# Patient Record
Sex: FEMALE | Race: WHITE | NOT HISPANIC OR LATINO | Employment: FULL TIME | URBAN - METROPOLITAN AREA
[De-identification: names, ages, dates, MRNs, and addresses within clinical notes are randomized per-mention and may not be internally consistent; named-entity substitution may affect disease eponyms.]

---

## 2017-03-13 ENCOUNTER — OFFICE VISIT (OUTPATIENT)
Dept: ORTHOPEDICS | Facility: CLINIC | Age: 57
End: 2017-03-13
Payer: COMMERCIAL

## 2017-03-13 VITALS
WEIGHT: 181 LBS | SYSTOLIC BLOOD PRESSURE: 118 MMHG | DIASTOLIC BLOOD PRESSURE: 77 MMHG | BODY MASS INDEX: 28.41 KG/M2 | HEIGHT: 67 IN | HEART RATE: 71 BPM

## 2017-03-13 DIAGNOSIS — M25.552 LEFT HIP PAIN: Primary | ICD-10-CM

## 2017-03-13 PROCEDURE — 99203 OFFICE O/P NEW LOW 30 MIN: CPT | Mod: S$GLB,,, | Performed by: ORTHOPAEDIC SURGERY

## 2017-03-13 PROCEDURE — 1160F RVW MEDS BY RX/DR IN RCRD: CPT | Mod: S$GLB,,, | Performed by: ORTHOPAEDIC SURGERY

## 2017-03-13 RX ORDER — MELOXICAM 15 MG/1
15 TABLET ORAL DAILY
COMMUNITY
End: 2017-03-17 | Stop reason: SDUPTHER

## 2017-03-13 NOTE — LETTER
March 13, 2017      Mode Lara MD  [address]  [facility] [place] [address]           [facility]  [address] [facility] [place] [address]  Phone: [phone]  Fax: [phone]          Patient: Danya Johnson   MR Number: [id number]   YOB: 1960   Date of Visit: 3/13/2017       Dear Dr. Mode Lara:    Thank you for referring Danya Johnson to me for evaluation. Attached you will find relevant portions of my assessment and plan of care.    If you have questions, please do not hesitate to call me. I look forward to following Danya Johnson along with you.    Sincerely,    Tony Lynn MD    Enclosure  CC:  No Recipients    If you would like to receive this communication electronically, please contact [email or web address] or [phone] to request more information on [employer] Link access.    For providers and/or their staff who would like to refer a patient to [facility], please contact us through our one-stop-shop provider referral line, [facility], at [phone].    If you feel you have received this communication in error or would no longer like to receive these types of communications, please e-mail [email or web address]

## 2017-03-13 NOTE — PROGRESS NOTES
Past Medical History:   Diagnosis Date    Arthritis        Past Surgical History:   Procedure Laterality Date     SECTION  1985    HYSTERECTOMY  1998       Current Outpatient Prescriptions   Medication Sig    meloxicam (MOBIC) 15 MG tablet Take 15 mg by mouth once daily.     No current facility-administered medications for this visit.        Review of patient's allergies indicates:  No Known Allergies    History reviewed. No pertinent family history.    Social History     Social History    Marital status:      Spouse name: N/A    Number of children: N/A    Years of education: N/A     Occupational History    Not on file.     Social History Main Topics    Smoking status: Current Every Day Smoker    Smokeless tobacco: Never Used    Alcohol use Not on file    Drug use: Not on file    Sexual activity: Not on file     Other Topics Concern    Not on file     Social History Narrative    No narrative on file       Chief Complaint:   Chief Complaint   Patient presents with    Left Hip - Pain       Consulting Physician: Mode Lara MD    History of present illness:    This is a 57 y.o. year old female who complains of left hip pain that she has had since she was young but has recently gotten worse in the last few months.  She states that she is always had popping and some degree of pain in her hip but over the last few months the pain is gotten up to a 9 out of 10.  She localizes the pain to the anterior groin area.  She states that anti-inflammatories to help somewhat.  She has done exercises which does not help.  She is also had prednisone which was helpful while she was taking it.  She denies any injury that she can recall.    Review of Systems:    Constitution: Denies chills, fever, and sweats.  HENT: Denies headaches or blurry vision.  Cardiovascular: Denies chest pain or irregular heart beat.  Respiratory: Denies cough or shortness of breath.  Gastrointestinal: Denies abdominal pain,  "nausea, or vomiting.  Musculoskeletal:  Denies muscle cramps.  Neurological: Denies dizziness or focal weakness.  Psychiatric/Behavioral: Normal mental status.  Hematologic/Lymphatic: Denies bleeding problem or easy bruising/bleeding.  Skin: Denies rash or suspicious lesions.    Examination:    Vital Signs:    Vitals:    03/13/17 1346   BP: 118/77   Pulse: 71   Weight: 82.1 kg (181 lb)   Height: 5' 7" (1.702 m)   PainSc:   9   PainLoc: Hip       Body mass index is 28.35 kg/(m^2).    This a well-developed, well nourished patient in no acute distress.    Alert and oriented and cooperative to examination.       Physical Exam: Left Hip Exam    Gait:   Normal    Skin  Rash:   None  Scars:   None    Inspection  Erythema:  None  Bruising:  None  Swelling:  None  Masses:  None  Lymphadenopathy: None    Range of Motion  Flexion:  150°  Extension:  0°  External Rotation: 50°  Internal Rotation: 15° with pain  Abduction:  50°    pain with hip range of motion.    Straight Leg Raise: Negative  Log Roll:  positive    Tenderness  Groin:   +  Greater Trochanter: +    Strength:  4+/5    Stability:  Normal    Sensation:  Intact    Vascular  Pulses:  Palpable distally          Imaging: X-rays ordered and reviewed today of the left hip reveal some mild degenerative changes.        Assessment: Left hip pain        Plan:  She has a painful pop in her left hip.  This is gotten significantly worse the last few months.  Although anti-inflammatories do help it does not last.  At this point I like to obtain an MRI for evaluation of a possible labral tear or other intra-articular pathology.  We did discuss an intra-articular injection she declined that.      DISCLAIMER: This note may have been dictated using voice recognition software and may contain grammatical errors.     NOTE: Consult report sent to referring provider via EPIC EMR.  "

## 2017-03-17 ENCOUNTER — HOSPITAL ENCOUNTER (OUTPATIENT)
Dept: RADIOLOGY | Facility: HOSPITAL | Age: 57
Discharge: HOME | End: 2017-03-17
Attending: ORTHOPAEDIC SURGERY
Payer: COMMERCIAL

## 2017-03-17 DIAGNOSIS — M25.552 LEFT HIP PAIN: ICD-10-CM

## 2017-03-17 PROCEDURE — 73721 MRI JNT OF LWR EXTRE W/O DYE: CPT | Mod: 26,LT,, | Performed by: RADIOLOGY

## 2017-03-17 PROCEDURE — 73721 MRI JNT OF LWR EXTRE W/O DYE: CPT | Mod: TC,LT

## 2017-03-17 RX ORDER — MELOXICAM 15 MG/1
15 TABLET ORAL DAILY
Qty: 30 TABLET | Refills: 3 | Status: SHIPPED | OUTPATIENT
Start: 2017-03-17 | End: 2020-12-02

## 2017-03-17 NOTE — TELEPHONE ENCOUNTER
----- Message from RT Jose sent at 3/17/2017  3:07 PM CDT -----  Contact: [phone]   Pt , requesting medication refill: Mobic, thanks.

## 2017-03-27 ENCOUNTER — OFFICE VISIT (OUTPATIENT)
Dept: ORTHOPEDICS | Facility: CLINIC | Age: 57
End: 2017-03-27
Payer: COMMERCIAL

## 2017-03-27 VITALS
HEIGHT: 67 IN | SYSTOLIC BLOOD PRESSURE: 114 MMHG | WEIGHT: 181 LBS | HEART RATE: 78 BPM | BODY MASS INDEX: 28.41 KG/M2 | DIASTOLIC BLOOD PRESSURE: 75 MMHG

## 2017-03-27 DIAGNOSIS — M25.552 LEFT HIP PAIN: Primary | ICD-10-CM

## 2017-03-27 PROCEDURE — 99213 OFFICE O/P EST LOW 20 MIN: CPT | Mod: S$GLB,,, | Performed by: ORTHOPAEDIC SURGERY

## 2017-03-27 PROCEDURE — 1160F RVW MEDS BY RX/DR IN RCRD: CPT | Mod: S$GLB,,, | Performed by: ORTHOPAEDIC SURGERY

## 2017-03-27 RX ORDER — TRAMADOL HYDROCHLORIDE 50 MG/1
50 TABLET ORAL EVERY 8 HOURS PRN
COMMUNITY
End: 2020-12-06 | Stop reason: CLARIF

## 2017-03-28 NOTE — PROGRESS NOTES
Past Medical History:   Diagnosis Date    Arthritis        Past Surgical History:   Procedure Laterality Date     SECTION  1985    HYSTERECTOMY         Current Outpatient Prescriptions   Medication Sig    meloxicam (MOBIC) 15 MG tablet Take 1 tablet (15 mg total) by mouth once daily.    tramadol (ULTRAM) 50 mg tablet Take 50 mg by mouth every 8 (eight) hours as needed for Pain.     No current facility-administered medications for this visit.        Review of patient's allergies indicates:  No Known Allergies    History reviewed. No pertinent family history.    Social History     Social History    Marital status:      Spouse name: N/A    Number of children: N/A    Years of education: N/A     Occupational History    Not on file.     Social History Main Topics    Smoking status: Current Every Day Smoker    Smokeless tobacco: Never Used    Alcohol use Not on file    Drug use: Not on file    Sexual activity: Not on file     Other Topics Concern    Not on file     Social History Narrative       Chief Complaint:   Chief Complaint   Patient presents with    Left Hip - Pain       Consulting Physician: No ref. provider found    History of present illness:    This is a 57 y.o. year old female who complains of left hip pain that she has had since she was young but has recently gotten worse in the last few months.  She states that she is always had popping and some degree of pain in her hip but over the last few months the pain is gotten up to a 9 out of 10.  She localizes the pain to the anterior groin area.  She states that anti-inflammatories to help somewhat.  She has done exercises which does not help.  She is also had prednisone which was helpful while she was taking it.  She denies any injury that she can recall.    Review of Systems:    Constitution: Denies chills, fever, and sweats.  HENT: Denies headaches or blurry vision.  Cardiovascular: Denies chest pain or irregular heart  "beat.  Respiratory: Denies cough or shortness of breath.  Gastrointestinal: Denies abdominal pain, nausea, or vomiting.  Musculoskeletal:  Denies muscle cramps.  Neurological: Denies dizziness or focal weakness.  Psychiatric/Behavioral: Normal mental status.  Hematologic/Lymphatic: Denies bleeding problem or easy bruising/bleeding.  Skin: Denies rash or suspicious lesions.    Examination:    Vital Signs:    Vitals:    03/27/17 1130   BP: 114/75   Pulse: 78   Weight: 82.1 kg (181 lb)   Height: 5' 7" (1.702 m)   PainSc:   8   PainLoc: Hip       Body mass index is 28.35 kg/(m^2).    This a well-developed, well nourished patient in no acute distress.    Alert and oriented and cooperative to examination.       Physical Exam: Left Hip Exam    Gait:   Normal    Skin  Rash:   None  Scars:   None    Inspection  Erythema:  None  Bruising:  None  Swelling:  None  Masses:  None  Lymphadenopathy: None    Range of Motion  Flexion:  150°  Extension:  0°  External Rotation: 50°  Internal Rotation: 15° with pain  Abduction:  50°    pain with hip range of motion.    Straight Leg Raise: Negative  Log Roll:  positive    Tenderness  Groin:   +  Greater Trochanter: +    Strength:  4+/5    Stability:  Normal    Sensation:  Intact    Vascular  Pulses:  Palpable distally          Imaging: X-rays ordered and reviewed today of the left hip reveal some mild degenerative changes.  The MRI of her hip shows no obvious pathology or large labral tear.  Her fovea does look slightly enlarged and there is a small signal on the anterior superior labrum.       Assessment: Left hip pain        Plan:  She continues to have pain and popping in her hip.  At this point I suggested that we get an intra-articular hip injection to localize our pathology better.    DISCLAIMER: This note may have been dictated using voice recognition software and may contain grammatical errors.     NOTE: Consult report sent to referring provider via EPIC EMR.  "

## 2017-04-03 ENCOUNTER — OFFICE VISIT (OUTPATIENT)
Dept: PHYSICAL MEDICINE AND REHAB | Facility: CLINIC | Age: 57
End: 2017-04-03
Payer: COMMERCIAL

## 2017-04-03 ENCOUNTER — TELEPHONE (OUTPATIENT)
Dept: PHYSICAL MEDICINE AND REHAB | Facility: CLINIC | Age: 57
End: 2017-04-03

## 2017-04-03 VITALS
DIASTOLIC BLOOD PRESSURE: 75 MMHG | WEIGHT: 181 LBS | BODY MASS INDEX: 28.41 KG/M2 | HEIGHT: 67 IN | SYSTOLIC BLOOD PRESSURE: 122 MMHG | HEART RATE: 81 BPM

## 2017-04-03 DIAGNOSIS — M25.552 LEFT HIP PAIN: Primary | ICD-10-CM

## 2017-04-03 DIAGNOSIS — M16.12 PRIMARY OSTEOARTHRITIS OF LEFT HIP: ICD-10-CM

## 2017-04-03 PROCEDURE — 99999 PR PBB SHADOW E&M-EST. PATIENT-LVL II: CPT | Mod: PBBFAC,,, | Performed by: PHYSICAL MEDICINE & REHABILITATION

## 2017-04-03 PROCEDURE — 99243 OFF/OP CNSLTJ NEW/EST LOW 30: CPT | Mod: 25,S$GLB,, | Performed by: PHYSICAL MEDICINE & REHABILITATION

## 2017-04-03 PROCEDURE — 20611 DRAIN/INJ JOINT/BURSA W/US: CPT | Mod: S$GLB,,, | Performed by: PHYSICAL MEDICINE & REHABILITATION

## 2017-04-03 RX ORDER — TRIAMCINOLONE ACETONIDE 40 MG/ML
40 INJECTION, SUSPENSION INTRA-ARTICULAR; INTRAMUSCULAR
Status: DISCONTINUED | OUTPATIENT
Start: 2017-04-03 | End: 2017-04-03 | Stop reason: HOSPADM

## 2017-04-03 RX ADMIN — TRIAMCINOLONE ACETONIDE 40 MG: 40 INJECTION, SUSPENSION INTRA-ARTICULAR; INTRAMUSCULAR at 08:04

## 2017-04-03 NOTE — PROGRESS NOTES
[facility]    Consulting Provider: Dr. Tony Lynn    CHIEF COMPLAINT: Left hip pain    HISTORY OF PRESENT ILLNESS: Danya Johnson is a 57 y.o. female who presents to me for the first time for chronic left hip pain.  There was no specific injury or trauma.  The pain is been increasing over the last several months.  There is a painful pop present.  She rates her pain as a 7 on a scale of 1-10.  She locates the pain deep in the left hip region.  The pain is achy and sharp in nature.  She has tried NSAIDs including meloxicam which does help but temporarily.  She presents today for an ultrasound-guided injection of corticosteroid to the left hip joint.    Review of Systems   Constitutional: Negative for fever.   HENT: Negative for drooling.    Eyes: Negative for discharge.   Respiratory: Negative for choking.    Cardiovascular: Negative for chest pain.   Genitourinary: Negative for flank pain.   Skin: Negative for wound.   Allergic/Immunologic: Negative for immunocompromised state.   Neurological: Negative for tremors and syncope.   Psychiatric/Behavioral: Negative for behavioral problems.     Past Medical History:   Past Medical History:   Diagnosis Date    Arthritis        Past Surgical History:   Past Surgical History:   Procedure Laterality Date     SECTION  1985    HYSTERECTOMY         Family History: No family history on file.    Medications:   Current Outpatient Prescriptions on File Prior to Visit   Medication Sig Dispense Refill    meloxicam (MOBIC) 15 MG tablet Take 1 tablet (15 mg total) by mouth once daily. 30 tablet 3    tramadol (ULTRAM) 50 mg tablet Take 50 mg by mouth every 8 (eight) hours as needed for Pain.       No current facility-administered medications on file prior to visit.        Allergies: Review of patient's allergies indicates:  No Known Allergies    Social History:   Social History     Social History    Marital status:      Spouse  name: N/A    Number of children: N/A    Years of education: N/A     Social History Main Topics    Smoking status: Current Every Day Smoker    Smokeless tobacco: Never Used    Alcohol use Not on file    Drug use: Not on file    Sexual activity: Not on file     Other Topics Concern    Not on file     Social History Narrative     PHYSICAL EXAMINATION:   General  Blood pressure 122/75, pulse 81  Constitutional: Oriented to person, place, and time. No apparent distress. Appears well-developed and well-nourished. Pleasant.  HENT:   Head: Normocephalic and atraumatic.   Eyes: Right eye exhibits no discharge. Left eye exhibits no discharge. No scleral icterus.   Pulmonary/Chest: Effort normal. No respiratory distress.   Abdominal: There is no guarding.   Neurological: Alert and oriented to person, place, and time.   Psychiatric: Behavior is normal.   Right Hip Exam     Tenderness   The patient is experiencing no tenderness.         Range of Motion   Flexion: normal   Internal Rotation: normal   External Rotation: normal   Abduction: normal   Adduction: normal     Muscle Strength   Abduction: 5/5   Adduction: 5/5   Flexion: 5/5     Other   Erythema: absent  Scars: absent  Sensation: normal  Pulse: present      Left Hip Exam     Tenderness   The patient is experiencing no tenderness.         Range of Motion   Flexion: 130   Internal Rotation: 20   External Rotation: 60   Abduction: 40   Adduction: 20     Muscle Strength   Abduction: 4/5   Adduction: 5/5   Flexion: 4/5     Tests   NOLAN: negative    Other   Erythema: absent  Scars: absent  Sensation: normal  Pulse: present        INSPECTION: There is no swelling, ecchymoses, erythema or gross deformity about the left hip.  LIGAMENTOUS LAXITY AND STABILITY: Negative NOLAN test. No pain with SI joint compression.   GAIT/DYNAMIC: Her gait is preserved.    Imaging  MRI of the left hip from 3/17/2017: 1.  Mild insertional tendinopathy at the greater trochanteric insertion  "of the left gluteus medius and minimus.  2.  Symmetric mild bilateral femoroacetabular osteoarthritis.  3.  No other acute abnormality to explain this patient's symptoms.    Data Reviewed: MRI    Supportive Actions: Independent visualization of images or test specimens    ASSESSMENT:   1. Left hip pain    2. Primary osteoarthritis of left hip      PLAN:     1. Time was spent reviewing the above diagnosis in depth with Danya shaniqua, including acute management and rehabilitation.     2.  She has signs, symptoms, and MRI findings suggestive of left intra-articular hip pathology.  I believe an ultrasound-guided injection of corticosteroid to the left hip joint is reasonable has both a therapeutic and diagnostic measure.  She is in favor of this option, see separate procedure note.    3. RTC prn.  She will follow-up with Dr. Lynn.    This is a consult from Dr. Tony Lynn. Please see the "Communications" section of Epic to see how the consulting physician received the report of today's findings and recommendations. If it's an [facility] physician, it will be forwarded to his/her "in basket".    The above note was completed, in part, with the aid of Dragon dictation software/hardware. Translation errors may be present.    "

## 2017-04-03 NOTE — PROCEDURES
Large Joint Aspiration/Injection  Date/Time: 4/3/2017 8:27 AM  Performed by: JAMIE ORTEGA  Authorized by: JAMIE ORTEGA     Consent Done?:  Yes (Verbal)  Indications:  Pain  Procedure site marked: Yes    Timeout: Prior to procedure the correct patient, procedure, and site was verified      Location:  Hip  Site:  L hip joint  Prep: Patient was prepped and draped in usual sterile fashion    Ultrasonic Guidance for needle placement: Yes  Images are saved and documented.  Needle size:  22 G  Approach: Needle in plane, distal to proximal, lateral to medial.  Medications:  40 mg triamcinolone acetonide 40 mg/mL  Patient tolerance:  Patient tolerated the procedure well with no immediate complications    Additional Comments: Ultrasound guidance was used for correct needle placement, the images were saved will be uploaded to EMR.

## 2017-04-03 NOTE — LETTER
April 3, 2017      Tony Lynn MD  [address] [place] [id number]           [facility]  [address] [place] [address] Drive  [facility] [address]  Phone: [phone]  Fax: [phone]          Patient: Danya Johnson   MR Number: [id number]   YOB: 1960   Date of Visit: 4/3/2017       Dear Dr. Tony Lynn:    Thank you for referring Danya Johnson to me for evaluation. Attached you will find relevant portions of my assessment and plan of care.    If you have questions, please do not hesitate to call me. I look forward to following Danya Johnson along with you.    Sincerely,    Christian Bowling MD    Enclosure  CC:  No Recipients    If you would like to receive this communication electronically, please contact [email or web address] or [phone] to request more information on "[employer] Link access.    For providers and/or their staff who would like to refer a patient to [facility], please contact us through our one-stop-shop provider referral line, [facility], at [phone].    If you feel you have received this communication in error or would no longer like to receive these types of communications, please e-mail [email or web address]

## 2017-04-03 NOTE — TELEPHONE ENCOUNTER
----- Message from Blaire Ty sent at 4/3/2017  7:21 AM CDT -----  Contact: [phone]    Calling  To  See if  Patient  Can  Be fitted in  Later  Today // right hip pain injection/ doulens//  Pt  Asked if  She  Could  Be  30  To  45 min  Late  Due to  Weather/ please call   For  details

## 2017-04-11 ENCOUNTER — TELEPHONE (OUTPATIENT)
Dept: PHYSICAL MEDICINE AND REHAB | Facility: CLINIC | Age: 57
End: 2017-04-11

## 2017-04-11 NOTE — TELEPHONE ENCOUNTER
----- Message from Christian Bowling MD sent at 4/10/2017  4:29 PM CDT -----  Please give her a call to see how she did after the injection. Thanks    ----- Message -----     From: Christian Bowling MD     Sent: 4/3/2017  10:07 AM       To: Christian Bowling MD

## 2017-04-12 ENCOUNTER — TELEPHONE (OUTPATIENT)
Dept: PHYSICAL MEDICINE AND REHAB | Facility: CLINIC | Age: 57
End: 2017-04-12

## 2019-01-08 ENCOUNTER — HOSPITAL ENCOUNTER (OUTPATIENT)
Dept: RADIOLOGY | Facility: HOSPITAL | Age: 59
Discharge: HOME | End: 2019-01-08
Attending: FAMILY MEDICINE
Payer: COMMERCIAL

## 2019-01-08 DIAGNOSIS — F17.200 SMOKER: ICD-10-CM

## 2019-01-08 DIAGNOSIS — F17.200 SMOKER: Primary | ICD-10-CM

## 2019-01-08 PROCEDURE — 71046 XR CHEST PA AND LATERAL: ICD-10-PCS | Mod: 26,,, | Performed by: RADIOLOGY

## 2019-01-08 PROCEDURE — 71046 X-RAY EXAM CHEST 2 VIEWS: CPT | Mod: 26,,, | Performed by: RADIOLOGY

## 2019-01-08 PROCEDURE — 71046 X-RAY EXAM CHEST 2 VIEWS: CPT | Mod: TC,FY

## 2019-06-06 ENCOUNTER — HOSPITAL ENCOUNTER (OUTPATIENT)
Dept: RADIOLOGY | Facility: HOSPITAL | Age: 59
Discharge: HOME | End: 2019-06-06
Attending: FAMILY MEDICINE
Payer: COMMERCIAL

## 2019-06-06 DIAGNOSIS — M25.552 LEFT HIP PAIN: ICD-10-CM

## 2019-06-06 DIAGNOSIS — M54.50 BACK PAIN, LUMBOSACRAL: ICD-10-CM

## 2019-06-06 DIAGNOSIS — M54.50 BACK PAIN, LUMBOSACRAL: Primary | ICD-10-CM

## 2019-06-06 DIAGNOSIS — I71.40 ABDOMINAL AORTIC ANEURYSM: ICD-10-CM

## 2019-06-06 PROCEDURE — 72110 X-RAY EXAM L-2 SPINE 4/>VWS: CPT | Mod: 26,,, | Performed by: RADIOLOGY

## 2019-06-06 PROCEDURE — 73502 XR PELVIS 3 VIEW INC HIP 2 VIEW LEFT: ICD-10-PCS | Mod: 26,LT,, | Performed by: RADIOLOGY

## 2019-06-06 PROCEDURE — 73502 X-RAY EXAM HIP UNI 2-3 VIEWS: CPT | Mod: 26,LT,, | Performed by: RADIOLOGY

## 2019-06-06 PROCEDURE — 73502 X-RAY EXAM HIP UNI 2-3 VIEWS: CPT | Mod: TC,FY,LT

## 2019-06-06 PROCEDURE — 72110 X-RAY EXAM L-2 SPINE 4/>VWS: CPT | Mod: TC,FY

## 2019-06-06 PROCEDURE — 72110 XR LUMBAR SPINE COMPLETE 5 VIEW: ICD-10-PCS | Mod: 26,,, | Performed by: RADIOLOGY

## 2019-06-12 ENCOUNTER — HOSPITAL ENCOUNTER (OUTPATIENT)
Dept: RADIOLOGY | Facility: HOSPITAL | Age: 59
Discharge: HOME | End: 2019-06-12
Attending: FAMILY MEDICINE
Payer: COMMERCIAL

## 2019-06-12 DIAGNOSIS — I71.40 ABDOMINAL AORTIC ANEURYSM: ICD-10-CM

## 2019-06-12 PROCEDURE — 74174 CTA ABD&PLVS W/CONTRAST: CPT | Mod: TC

## 2019-06-12 PROCEDURE — 74174 CTA ABDOMEN AND PELVIS: ICD-10-PCS | Mod: 26,,, | Performed by: RADIOLOGY

## 2019-06-12 PROCEDURE — 25500020 PHARM REV CODE 255

## 2019-06-12 PROCEDURE — 74174 CTA ABD&PLVS W/CONTRAST: CPT | Mod: 26,,, | Performed by: RADIOLOGY

## 2019-06-12 RX ADMIN — IOHEXOL 100 ML: 350 INJECTION, SOLUTION INTRAVENOUS at 10:06

## 2019-06-24 DIAGNOSIS — M54.16 LUMBAR RADICULOPATHY: ICD-10-CM

## 2019-06-28 ENCOUNTER — HOSPITAL ENCOUNTER (OUTPATIENT)
Dept: RADIOLOGY | Facility: HOSPITAL | Age: 59
Discharge: HOME | End: 2019-06-28
Attending: SPECIALIST
Payer: COMMERCIAL

## 2019-06-28 DIAGNOSIS — M54.16 LUMBAR RADICULOPATHY: ICD-10-CM

## 2019-06-28 PROCEDURE — 72148 MRI LUMBAR SPINE W/O DYE: CPT | Mod: TC

## 2019-06-28 PROCEDURE — 72148 MRI LUMBAR SPINE W/O DYE: CPT | Mod: 26,,, | Performed by: RADIOLOGY

## 2019-06-28 PROCEDURE — 72148 MRI LUMBAR SPINE WITHOUT CONTRAST: ICD-10-PCS | Mod: 26,,, | Performed by: RADIOLOGY

## 2020-05-27 DIAGNOSIS — F17.200 SMOKER: Primary | ICD-10-CM

## 2020-05-27 DIAGNOSIS — Z83.3 FAMILY HISTORY OF DIABETES MELLITUS: ICD-10-CM

## 2020-05-27 DIAGNOSIS — I63.9 CVA (CEREBRAL VASCULAR ACCIDENT): ICD-10-CM

## 2020-05-27 DIAGNOSIS — R53.81 MALAISE: ICD-10-CM

## 2020-05-27 DIAGNOSIS — R53.83 LETHARGY: ICD-10-CM

## 2020-06-02 ENCOUNTER — HOSPITAL ENCOUNTER (OUTPATIENT)
Dept: RADIOLOGY | Facility: HOSPITAL | Age: 60
Discharge: HOME | End: 2020-06-02
Attending: FAMILY MEDICINE
Payer: COMMERCIAL

## 2020-06-02 DIAGNOSIS — I63.9 CVA (CEREBRAL VASCULAR ACCIDENT): ICD-10-CM

## 2020-06-02 DIAGNOSIS — R53.81 MALAISE: ICD-10-CM

## 2020-06-02 DIAGNOSIS — F17.200 SMOKER: ICD-10-CM

## 2020-06-02 DIAGNOSIS — R53.83 LETHARGY: ICD-10-CM

## 2020-06-02 DIAGNOSIS — Z83.3 FAMILY HISTORY OF DIABETES MELLITUS: ICD-10-CM

## 2020-06-02 PROCEDURE — 71046 X-RAY EXAM CHEST 2 VIEWS: CPT | Mod: TC,FY

## 2020-06-02 PROCEDURE — 71046 XR CHEST PA AND LATERAL: ICD-10-PCS | Mod: 26,,, | Performed by: RADIOLOGY

## 2020-06-02 PROCEDURE — 71046 X-RAY EXAM CHEST 2 VIEWS: CPT | Mod: 26,,, | Performed by: RADIOLOGY

## 2020-12-02 ENCOUNTER — OFFICE VISIT (OUTPATIENT)
Dept: PODIATRY | Facility: CLINIC | Age: 60
End: 2020-12-02
Payer: COMMERCIAL

## 2020-12-02 VITALS
DIASTOLIC BLOOD PRESSURE: 83 MMHG | SYSTOLIC BLOOD PRESSURE: 148 MMHG | TEMPERATURE: 98 F | BODY MASS INDEX: 29.35 KG/M2 | HEIGHT: 67 IN | HEART RATE: 78 BPM | WEIGHT: 187 LBS

## 2020-12-02 DIAGNOSIS — M72.2 PLANTAR FASCIITIS: Primary | ICD-10-CM

## 2020-12-02 DIAGNOSIS — D21.22 FIBROMA OF LEFT FOOT: ICD-10-CM

## 2020-12-02 PROCEDURE — 3008F BODY MASS INDEX DOCD: CPT | Mod: CPTII,S$GLB,, | Performed by: PODIATRIST

## 2020-12-02 PROCEDURE — 1125F PR PAIN SEVERITY QUANTIFIED, PAIN PRESENT: ICD-10-PCS | Mod: S$GLB,,, | Performed by: PODIATRIST

## 2020-12-02 PROCEDURE — 99999 PR PBB SHADOW E&M-EST. PATIENT-LVL III: ICD-10-PCS | Mod: PBBFAC,,, | Performed by: PODIATRIST

## 2020-12-02 PROCEDURE — 1125F AMNT PAIN NOTED PAIN PRSNT: CPT | Mod: S$GLB,,, | Performed by: PODIATRIST

## 2020-12-02 PROCEDURE — 99999 PR PBB SHADOW E&M-EST. PATIENT-LVL III: CPT | Mod: PBBFAC,,, | Performed by: PODIATRIST

## 2020-12-02 PROCEDURE — 3008F PR BODY MASS INDEX (BMI) DOCUMENTED: ICD-10-PCS | Mod: CPTII,S$GLB,, | Performed by: PODIATRIST

## 2020-12-02 PROCEDURE — 99203 PR OFFICE/OUTPT VISIT, NEW, LEVL III, 30-44 MIN: ICD-10-PCS | Mod: S$GLB,,, | Performed by: PODIATRIST

## 2020-12-02 PROCEDURE — 99203 OFFICE O/P NEW LOW 30 MIN: CPT | Mod: S$GLB,,, | Performed by: PODIATRIST

## 2020-12-02 RX ORDER — DICLOFENAC SODIUM 75 MG/1
75 TABLET, DELAYED RELEASE ORAL 2 TIMES DAILY
Qty: 60 TABLET | Refills: 1 | Status: SHIPPED | OUTPATIENT
Start: 2020-12-02 | End: 2020-12-06 | Stop reason: CLARIF

## 2020-12-02 RX ORDER — ROSUVASTATIN CALCIUM 10 MG/1
10 TABLET, COATED ORAL NIGHTLY
COMMUNITY
Start: 2020-08-30

## 2020-12-05 PROBLEM — D21.22 FIBROMA OF LEFT FOOT: Status: ACTIVE | Noted: 2020-12-05

## 2020-12-06 ENCOUNTER — HOSPITAL ENCOUNTER (EMERGENCY)
Facility: HOSPITAL | Age: 60
Discharge: HOME | End: 2020-12-06
Attending: FAMILY MEDICINE
Payer: COMMERCIAL

## 2020-12-06 VITALS
HEIGHT: 67 IN | RESPIRATION RATE: 18 BRPM | OXYGEN SATURATION: 99 % | TEMPERATURE: 98 F | WEIGHT: 191 LBS | DIASTOLIC BLOOD PRESSURE: 94 MMHG | HEART RATE: 70 BPM | BODY MASS INDEX: 29.98 KG/M2 | SYSTOLIC BLOOD PRESSURE: 144 MMHG

## 2020-12-06 DIAGNOSIS — R10.9 RIGHT FLANK PAIN: Primary | ICD-10-CM

## 2020-12-06 LAB
ALBUMIN SERPL BCP-MCNC: 4.1 G/DL (ref 3.5–5.2)
ALP SERPL-CCNC: 89 U/L (ref 55–135)
ALT SERPL W/O P-5'-P-CCNC: 21 U/L (ref 10–44)
ANION GAP SERPL CALC-SCNC: 7 MMOL/L (ref 8–16)
AST SERPL-CCNC: 19 U/L (ref 10–40)
BASOPHILS # BLD AUTO: 0.06 K/UL (ref 0–0.2)
BASOPHILS NFR BLD: 0.4 % (ref 0–1.9)
BILIRUB SERPL-MCNC: 0.2 MG/DL (ref 0.1–1)
BILIRUB UR QL STRIP: NEGATIVE
BUN SERPL-MCNC: 16 MG/DL (ref 8–23)
CALCIUM SERPL-MCNC: 9.1 MG/DL (ref 8.7–10.5)
CHLORIDE SERPL-SCNC: 103 MMOL/L (ref 95–110)
CLARITY UR: CLEAR
CO2 SERPL-SCNC: 27 MMOL/L (ref 23–29)
COLOR UR: YELLOW
CREAT SERPL-MCNC: 0.8 MG/DL (ref 0.5–1.4)
DIFFERENTIAL METHOD: ABNORMAL
EOSINOPHIL # BLD AUTO: 0.2 K/UL (ref 0–0.5)
EOSINOPHIL NFR BLD: 1.3 % (ref 0–8)
ERYTHROCYTE [DISTWIDTH] IN BLOOD BY AUTOMATED COUNT: 12.8 % (ref 11.5–14.5)
EST. GFR  (AFRICAN AMERICAN): >60 ML/MIN/1.73 M^2
EST. GFR  (NON AFRICAN AMERICAN): >60 ML/MIN/1.73 M^2
GLUCOSE SERPL-MCNC: 96 MG/DL (ref 70–110)
GLUCOSE UR QL STRIP: NEGATIVE
HCT VFR BLD AUTO: 41.4 % (ref 37–48.5)
HGB BLD-MCNC: 13.8 G/DL (ref 12–16)
HGB UR QL STRIP: NEGATIVE
IMM GRANULOCYTES # BLD AUTO: 0.05 K/UL (ref 0–0.04)
IMM GRANULOCYTES NFR BLD AUTO: 0.4 % (ref 0–0.5)
KETONES UR QL STRIP: NEGATIVE
LEUKOCYTE ESTERASE UR QL STRIP: NEGATIVE
LIPASE SERPL-CCNC: 40 U/L (ref 4–60)
LYMPHOCYTES # BLD AUTO: 1.5 K/UL (ref 1–4.8)
LYMPHOCYTES NFR BLD: 11.4 % (ref 18–48)
MCH RBC QN AUTO: 31.4 PG (ref 27–31)
MCHC RBC AUTO-ENTMCNC: 33.3 G/DL (ref 32–36)
MCV RBC AUTO: 94 FL (ref 82–98)
MONOCYTES # BLD AUTO: 0.6 K/UL (ref 0.3–1)
MONOCYTES NFR BLD: 4.2 % (ref 4–15)
NEUTROPHILS # BLD AUTO: 11.1 K/UL (ref 1.8–7.7)
NEUTROPHILS NFR BLD: 82.3 % (ref 38–73)
NITRITE UR QL STRIP: NEGATIVE
NRBC BLD-RTO: 0 /100 WBC
PH UR STRIP: 6 [PH] (ref 5–8)
PLATELET # BLD AUTO: 195 K/UL (ref 150–350)
PMV BLD AUTO: 10.5 FL (ref 9.2–12.9)
POTASSIUM SERPL-SCNC: 4.2 MMOL/L (ref 3.5–5.1)
PROT SERPL-MCNC: 6.8 G/DL (ref 6–8.4)
PROT UR QL STRIP: NEGATIVE
RBC # BLD AUTO: 4.39 M/UL (ref 4–5.4)
SODIUM SERPL-SCNC: 137 MMOL/L (ref 136–145)
SP GR UR STRIP: 1.01 (ref 1–1.03)
URN SPEC COLLECT METH UR: NORMAL
UROBILINOGEN UR STRIP-ACNC: NEGATIVE EU/DL
WBC # BLD AUTO: 13.47 K/UL (ref 3.9–12.7)

## 2020-12-06 PROCEDURE — 96361 HYDRATE IV INFUSION ADD-ON: CPT

## 2020-12-06 PROCEDURE — 85025 COMPLETE CBC W/AUTO DIFF WBC: CPT

## 2020-12-06 PROCEDURE — 96374 THER/PROPH/DIAG INJ IV PUSH: CPT

## 2020-12-06 PROCEDURE — 25000003 PHARM REV CODE 250: Performed by: NURSE PRACTITIONER

## 2020-12-06 PROCEDURE — 74176 CT ABD & PELVIS W/O CONTRAST: CPT | Mod: TC

## 2020-12-06 PROCEDURE — 80053 COMPREHEN METABOLIC PANEL: CPT

## 2020-12-06 PROCEDURE — 83690 ASSAY OF LIPASE: CPT

## 2020-12-06 PROCEDURE — 63600175 PHARM REV CODE 636 W HCPCS: Performed by: NURSE PRACTITIONER

## 2020-12-06 PROCEDURE — 99285 EMERGENCY DEPT VISIT HI MDM: CPT | Mod: 25

## 2020-12-06 PROCEDURE — 81003 URINALYSIS AUTO W/O SCOPE: CPT

## 2020-12-06 PROCEDURE — 74176 CT ABD & PELVIS W/O CONTRAST: CPT | Mod: 26,,, | Performed by: RADIOLOGY

## 2020-12-06 PROCEDURE — 74176 CT ABDOMEN PELVIS WITHOUT CONTRAST: ICD-10-PCS | Mod: 26,,, | Performed by: RADIOLOGY

## 2020-12-06 RX ORDER — DICLOFENAC SODIUM 75 MG/1
75 TABLET, DELAYED RELEASE ORAL 2 TIMES DAILY
COMMUNITY

## 2020-12-06 RX ORDER — KETOROLAC TROMETHAMINE 10 MG/1
10 TABLET, FILM COATED ORAL EVERY 6 HOURS PRN
Qty: 20 TABLET | Refills: 0 | Status: SHIPPED | OUTPATIENT
Start: 2020-12-06

## 2020-12-06 RX ORDER — KETOROLAC TROMETHAMINE 30 MG/ML
30 INJECTION, SOLUTION INTRAMUSCULAR; INTRAVENOUS
Status: COMPLETED | OUTPATIENT
Start: 2020-12-06 | End: 2020-12-06

## 2020-12-06 RX ADMIN — KETOROLAC TROMETHAMINE 30 MG: 30 INJECTION, SOLUTION INTRAMUSCULAR at 01:12

## 2020-12-06 RX ADMIN — SODIUM CHLORIDE 1000 ML: 0.9 INJECTION, SOLUTION INTRAVENOUS at 01:12

## 2020-12-06 NOTE — PROGRESS NOTES
Subjective:       Patient ID: Danya Johnson is a 60 y.o. female.    Chief Complaint: Follow-up, Heel Pain, and Foot Pain   Patient presents today with complaint of left heel pain for the past 5-6 months she states it feels as if her heel is bruised.  Patient states her feet are exceptionally painful on her 1st steps out of bed in the morning or after she has been sitting for short period of time she states she did get some new shoes which did seem to help the problem at 1st but is no longer helping she has tried taking ibuprofen which has not helped.    Past Medical History:   Diagnosis Date    Arthritis      Past Surgical History:   Procedure Laterality Date     SECTION  1985    HYSTERECTOMY       Family History   Problem Relation Age of Onset    Diabetes Mother     Heart disease Mother     Diabetes Father     Heart disease Father      Social History     Socioeconomic History    Marital status:      Spouse name: Not on file    Number of children: Not on file    Years of education: Not on file    Highest education level: Not on file   Occupational History    Not on file   Social Needs    Financial resource strain: Not on file    Food insecurity     Worry: Not on file     Inability: Not on file    Transportation needs     Medical: Not on file     Non-medical: Not on file   Tobacco Use    Smoking status: Current Every Day Smoker     Years: 30.00     Types: Cigarettes    Smokeless tobacco: Never Used   Substance and Sexual Activity    Alcohol use: Not Currently    Drug use: Never    Sexual activity: Yes     Partners: Male   Lifestyle    Physical activity     Days per week: Not on file     Minutes per session: Not on file    Stress: Not on file   Relationships    Social connections     Talks on phone: Not on file     Gets together: Not on file     Attends Taoist service: Not on file     Active member of club or organization: Not on file     Attends meetings of clubs or  "organizations: Not on file     Relationship status: Not on file   Other Topics Concern    Not on file   Social History Narrative    Not on file       Current Outpatient Medications   Medication Sig Dispense Refill    diclofenac (VOLTAREN) 75 MG EC tablet Take 1 tablet (75 mg total) by mouth 2 (two) times daily. 60 tablet 1    rosuvastatin (CRESTOR) 10 MG tablet Take 10 mg by mouth nightly.      tramadol (ULTRAM) 50 mg tablet Take 50 mg by mouth every 8 (eight) hours as needed for Pain.       No current facility-administered medications for this visit.      Review of patient's allergies indicates:  No Known Allergies    Review of Systems   Musculoskeletal: Positive for arthralgias.   All other systems reviewed and are negative.      Objective:      Vitals:    12/02/20 1319   BP: (!) 148/83   Pulse: 78   Temp: 97.9 °F (36.6 °C)   Weight: 84.8 kg (187 lb)   Height: 5' 7" (1.702 m)     Physical Exam  Vitals signs and nursing note reviewed.   Constitutional:       Appearance: Normal appearance.   Cardiovascular:      Pulses:           Dorsalis pedis pulses are 1+ on the right side and 1+ on the left side.        Posterior tibial pulses are 1+ on the right side and 1+ on the left side.   Pulmonary:      Effort: Pulmonary effort is normal.   Musculoskeletal:         General: Swelling and tenderness present.        Feet:    Feet:      Right foot:      Protective Sensation: 2 sites tested. 2 sites sensed.      Left foot:      Protective Sensation: 2 sites tested. 2 sites sensed.      Skin integrity: Erythema and warmth present.   Skin:     General: Skin is warm.      Capillary Refill: Capillary refill takes 2 to 3 seconds.      Findings: Erythema present.   Neurological:      General: No focal deficit present.      Mental Status: She is alert.   Psychiatric:         Mood and Affect: Mood normal.         Behavior: Behavior normal.         Thought Content: Thought content normal.         Judgment: Judgment normal. "              Assessment:       1. Plantar fasciitis        Plan:       Patient presents today with complaint of left heel pain for the past 5-6 months she states it feels as if her heel is bruised.  Patient states her feet are exceptionally painful on her 1st steps out of bed in the morning or after she has been sitting for short period of time she states she did get some new shoes which did seem to help the problem at 1st but is no longer helping she has tried taking ibuprofen which has not helped.  On evaluation patient has findings consistent with plantar fasciitis left patient does have significantly elevated arches both weight-bearing and nonweightbearing bilateral.  Patient advised she is going to need to discontinue all barefoot walking she currently wears slippers in the house I have advised her I am okay with her wearing the slippers as long as she puts her arch supports in the slippers she needs to make sure she is not going barefoot at all she is using the appropriate arch support at all times I have also started the patient on diclofenac.  I did discuss a steroid injection IM left side and Toradol injection however the patient deferred this and did not want to do the injections at this time.  Patient does have a fibroma formation present on the plantar left midfoot area just proximal to the 1st MPJ this is approximately 2 cm in diameter and is nontender to palpation.  I did recommend power step control arch supports for the patient advising her we may need to add the additional support to these as tolerated I am going to see her for follow-up in 2-3 weeks to ensure that she is improving she will continue using the frozen water bottle as necessary cut this does give her some relief and start taking the diclofenac.  Patient advised to contact us with any problems questions or or concerns prior to scheduled follow-up.  Face-to-face time equaled 30 min.This note was created using [employer] voice recognition  software that occasionally misinterpreted phrases or words.

## 2020-12-06 NOTE — DISCHARGE INSTRUCTIONS
Take all medications as prescribed per pharmacy instructions.      Download the [employer] Personal [employer] Device indira to access your health records & test results.  Please remember that you had a visit to the emergency room today and this does not substitute as primary care services for ongoing management because emergency services is a snap shot in time.  Should you have any worsening condition that requires emergency services do not hesitate to return to the ER.    COVID-19 TESTING  Hot Line [phone]  [address] [place] [address]  [facility]  Hours: 8am-5pm Monday - Friday   8am-noon Saturday - Sunday

## 2020-12-06 NOTE — ED PROVIDER NOTES
Encounter Date: 2020       History     Chief Complaint   Patient presents with    Flank Pain     Patient complaining of right flank pain radiating to front, frequency and urgency.     60-year-old female presents to ER for concerns of right flank pain x3 hours, patient also states that has urinary hesitancy; denies exacerbating or relieving factors, no prescription or OTC medications have been initiated, symptoms gradually worsening since onset described as moderate currently    Denies:  fever, headache, dizziness, syncope, vision changes, neck pain, painful/difficult swallowing, chest pain, shortness breath, cough, abdominal pain, nausea/vomiting/diarrhea, hematuria/dysuria    No previous evaluation has been performed nor has PCP been contacted for today's concerns    Past medical/surgical history, allergies & current medications reviewed with patient    Known SARS-CoV2 exposure:  No  Room:  13      The history is provided by the patient. No  was used.     Review of patient's allergies indicates:  No Known Allergies  Past Medical History:   Diagnosis Date    Arthritis     Hyperlipidemia      Past Surgical History:   Procedure Laterality Date     SECTION  1985    HYSTERECTOMY       Family History   Problem Relation Age of Onset    Diabetes Mother     Heart disease Mother     Diabetes Father     Heart disease Father      Social History     Tobacco Use    Smoking status: Current Every Day Smoker     Years: 30.00     Types: Cigarettes    Smokeless tobacco: Never Used   Substance Use Topics    Alcohol use: Not Currently    Drug use: Never     Review of Systems   Constitutional: Negative for fever.   HENT: Negative for sore throat.    Respiratory: Negative for cough and shortness of breath.    Cardiovascular: Negative for chest pain.   Gastrointestinal: Negative for abdominal pain and nausea.   Genitourinary: Positive for flank pain. Negative for dysuria.    Musculoskeletal: Negative for back pain.   Skin: Negative for rash.   Neurological: Negative for weakness and headaches.   Hematological: Does not bruise/bleed easily.   All other systems reviewed and are negative.      Physical Exam     Initial Vitals [12/06/20 1259]   BP Pulse Resp Temp SpO2   (!) 146/96 72 20 98.1 °F (36.7 °C) 99 %      MAP       --         Physical Exam    Nursing note and vitals reviewed.  Constitutional: She appears well-developed. She does not appear ill. No distress.   AF, VSS   HENT:   Head: Normocephalic and atraumatic.   Right Ear: External ear normal.   Left Ear: External ear normal.   Nose: Nose normal.   Eyes: Lids are normal.   Neck: Neck supple.   Cardiovascular: Normal rate.   Pulmonary/Chest: Effort normal and breath sounds normal. No respiratory distress.   Abdominal: Soft. Bowel sounds are normal. She exhibits no distension. There is no hepatosplenomegaly. There is no abdominal tenderness. There is CVA tenderness. There is no tenderness at McBurney's point. No hernia.   Neurological: She is alert.   Skin: No rash noted.   Psychiatric: She has a normal mood and affect.         ED Course   Procedures  Labs Reviewed   CBC W/ AUTO DIFFERENTIAL - Abnormal; Notable for the following components:       Result Value    WBC 13.47 (*)     MCH 31.4 (*)     Gran # (ANC) 11.1 (*)     Immature Grans (Abs) 0.05 (*)     Gran % 82.3 (*)     Lymph % 11.4 (*)     All other components within normal limits   COMPREHENSIVE METABOLIC PANEL - Abnormal; Notable for the following components:    Anion Gap 7 (*)     All other components within normal limits   LIPASE   URINALYSIS, REFLEX TO URINE CULTURE    Narrative:     Specimen Source->Urine          Imaging Results          CT Abdomen Pelvis  Without Contrast (Final result)  Result time 12/06/20 13:54:32    Final result by Epifanio Mohan MD (12/06/20 13:54:32)                 Impression:      1. There is no acute or significant abnormality on this  unenhanced study.  Specifically, there is no evidence of obstructive uropathy.  There is no hydronephrosis.  There are no renal or ureteral calculi on either side.  The bladder is grossly normal.  2. There is a stable known infrarenal abdominal aortic aneurysm.  3. There are stable bilateral adrenal masses.  These were present on comparison CTA abdomen and pelvis dated 06/12/2019 but also seen on lumbar spine MRI dating back to 06/22/2011.  The imaging findings are most consistent with stable adrenal adenomas.  4. There is degenerative change in the lumbar spine most apparent at the L3-4 level.      Electronically signed by: Epifanio Mohan MD  Date:    12/06/2020  Time:    13:54             Narrative:    EXAMINATION:  CT ABDOMEN PELVIS WITHOUT CONTRAST    CLINICAL HISTORY:  Flank pain, kidney stone suspected;Right flank;    TECHNIQUE:  Routine CT abdomen and pelvis was performed without oral or intravenous contrast.  Sagittal and coronal reformatted images were created.    COMPARISON:  CTA abdomen and pelvis dated 06/12/2019    FINDINGS:  Included CHEST:    The included lower lungs are clear.  There is no infiltrate or mass.  There is no pleural effusion.  Heart size is normal.  There is no pericardial fluid.    ABDOMEN:    The liver, spleen, pancreas, gallbladder and adrenal glands are normal.    Redemonstrated is an abdominal aortic aneurysm measuring approximately 3.8 cm in greatest transverse dimension and approximately 7 cm craniocaudad, unchanged compared to prior CTA.  There is mild calcified atherosclerosis.    There is no hydronephrosis.  There are no renal calculi.  A small right renal cyst demonstrated on prior contrast enhanced study is not clearly apparent on this unenhanced study.    There is no free intraperitoneal air or fluid.  There is no bowel obstruction.  There are no dilated loops of bowel.  There is no bowel wall thickening.    The there are bilateral low-density adrenal masses which are  unchanged compared to the prior CT abdomen and pelvis.  Also, the left adrenal nodule was present on prior lumbar spine MRI dated 06/28/2019 as well as on older lumbar spine MRI dated 06/22/2011 at which time both adrenal glands were included in both adrenal masses were present.  There are unchanged.  The low-density suggest these represent stable adrenal adenomas.    PELVIS:    The bladder is normal in appearance.  The uterus is surgically absent.  There is no pelvic mass or fluid.    BONES: There is degenerative change in the lumbar spine with marked disc space narrowing at the L3-4 level and trace retrolisthesis of L3 on L4 which is exaggerated by osteophyte formation.  There is no fracture.    MISCELLANEOUS: The included abdominal wall is grossly normal.                                 Medical Decision Making:   ED Management:  Abdominal CT image/report reviewed:    1. There is no acute or significant abnormality on this unenhanced study.  Specifically, there is no evidence of obstructive uropathy.  There is no hydronephrosis.  There are no renal or ureteral calculi on either side.  The bladder is grossly normal.  2. There is a stable known infrarenal abdominal aortic aneurysm.  3. There are stable bilateral adrenal masses.  These were present on comparison CTA abdomen and pelvis dated 06/12/2019 but also seen on lumbar spine MRI dating back to 06/22/2011.  The imaging findings are most consistent with stable adrenal adenomas.  4. There is degenerative change in the lumbar spine most apparent at the L3-4 level.    Lab results reviewed, significant findings:  WBC 13, UA is unimpressive -- no critical findings    Medications given:  IVFs, Toradol    Findings, diagnosis & plan of care discussed with patient:  Right flank pain; will discharge patient with Toradol, care instructions given -- instructed to follow-up with PCP for any further concerns    All questions answered, strict return precautions given, patient  agrees with plan of care & verbalizes understanding to all instructions, pleasant visit -- vital signs are stable & patient is in no distress at discharge    Disclaimer:  This note was prepared with [employer] voice recognition transcription software. Garbled syntax, mangled pronouns, and other bizarre constructions may be attributed to that software system.  Should there be any questions do not hesitate to contact me for clarification.                               Clinical Impression:     ICD-10-CM ICD-9-CM   1. Right flank pain  R10.9 789.09                          ED Disposition Condition    Discharge Stable        ED Prescriptions     Medication Sig Dispense Start Date End Date Auth. Provider    ketorolac (TORADOL) 10 mg tablet Take 1 tablet (10 mg total) by mouth every 6 (six) hours as needed for Pain. 20 tablet 12/6/2020  Jayson Canas NP        Follow-up Information     Follow up With Specialties Details Why Contact Info    Mode Lara MD Family Medicine Go to  For further concerns [id number]  [facility] [id number]  [phone]                                         Jayson Canas NP  12/06/20 8870

## 2021-03-05 ENCOUNTER — TELEPHONE (OUTPATIENT)
Dept: PODIATRY | Facility: CLINIC | Age: 61
End: 2021-03-05

## 2021-03-08 ENCOUNTER — OFFICE VISIT (OUTPATIENT)
Dept: PODIATRY | Facility: CLINIC | Age: 61
End: 2021-03-08
Payer: COMMERCIAL

## 2021-03-08 VITALS
SYSTOLIC BLOOD PRESSURE: 130 MMHG | DIASTOLIC BLOOD PRESSURE: 69 MMHG | HEIGHT: 67 IN | BODY MASS INDEX: 29.03 KG/M2 | TEMPERATURE: 98 F | HEART RATE: 86 BPM | WEIGHT: 185 LBS

## 2021-03-08 DIAGNOSIS — M72.2 PLANTAR FASCIITIS: Primary | ICD-10-CM

## 2021-03-08 DIAGNOSIS — D21.22 FIBROMA OF LEFT FOOT: ICD-10-CM

## 2021-03-08 PROCEDURE — 3008F BODY MASS INDEX DOCD: CPT | Mod: CPTII,S$GLB,, | Performed by: PODIATRIST

## 2021-03-08 PROCEDURE — 99214 OFFICE O/P EST MOD 30 MIN: CPT | Mod: S$GLB,,, | Performed by: PODIATRIST

## 2021-03-08 PROCEDURE — 99214 PR OFFICE/OUTPT VISIT, EST, LEVL IV, 30-39 MIN: ICD-10-PCS | Mod: S$GLB,,, | Performed by: PODIATRIST

## 2021-03-08 PROCEDURE — 3008F PR BODY MASS INDEX (BMI) DOCUMENTED: ICD-10-PCS | Mod: CPTII,S$GLB,, | Performed by: PODIATRIST

## 2021-03-08 PROCEDURE — 99999 PR PBB SHADOW E&M-EST. PATIENT-LVL III: CPT | Mod: PBBFAC,,, | Performed by: PODIATRIST

## 2021-03-08 PROCEDURE — 99999 PR PBB SHADOW E&M-EST. PATIENT-LVL III: ICD-10-PCS | Mod: PBBFAC,,, | Performed by: PODIATRIST

## 2021-03-08 PROCEDURE — 1125F AMNT PAIN NOTED PAIN PRSNT: CPT | Mod: S$GLB,,, | Performed by: PODIATRIST

## 2021-03-08 PROCEDURE — 1125F PR PAIN SEVERITY QUANTIFIED, PAIN PRESENT: ICD-10-PCS | Mod: S$GLB,,, | Performed by: PODIATRIST

## 2021-03-08 RX ORDER — TRAMADOL HYDROCHLORIDE 50 MG/1
50 TABLET ORAL 3 TIMES DAILY
Qty: 21 TABLET | Refills: 0 | Status: SHIPPED | OUTPATIENT
Start: 2021-03-08 | End: 2021-03-15

## 2021-12-01 ENCOUNTER — HOSPITAL ENCOUNTER (OUTPATIENT)
Dept: RADIOLOGY | Facility: HOSPITAL | Age: 61
Discharge: HOME | End: 2021-12-01
Attending: FAMILY MEDICINE
Payer: COMMERCIAL

## 2021-12-01 ENCOUNTER — HOSPITAL ENCOUNTER (OUTPATIENT)
Dept: RADIOLOGY | Facility: HOSPITAL | Age: 61
Discharge: HOME | End: 2021-12-01
Payer: COMMERCIAL

## 2021-12-01 DIAGNOSIS — F17.200 SMOKER: ICD-10-CM

## 2021-12-01 DIAGNOSIS — M25.561 PAIN AND SWELLING OF KNEE, RIGHT: ICD-10-CM

## 2021-12-01 DIAGNOSIS — J44.9 COPD (CHRONIC OBSTRUCTIVE PULMONARY DISEASE): ICD-10-CM

## 2021-12-01 DIAGNOSIS — M25.561 PAIN IN RIGHT KNEE: ICD-10-CM

## 2021-12-01 DIAGNOSIS — M25.461 PAIN AND SWELLING OF KNEE, RIGHT: ICD-10-CM

## 2021-12-07 ENCOUNTER — OFFICE VISIT (OUTPATIENT)
Dept: ORTHOPEDICS | Facility: CLINIC | Age: 61
End: 2021-12-07
Payer: COMMERCIAL

## 2021-12-07 VITALS — HEIGHT: 67 IN | BODY MASS INDEX: 29.03 KG/M2 | WEIGHT: 184.94 LBS

## 2021-12-07 DIAGNOSIS — M25.561 ACUTE PAIN OF RIGHT KNEE: Primary | ICD-10-CM

## 2021-12-07 PROCEDURE — 99999 PR PBB SHADOW E&M-EST. PATIENT-LVL III: CPT | Mod: PBBFAC,,, | Performed by: ORTHOPAEDIC SURGERY

## 2021-12-07 PROCEDURE — 99203 OFFICE O/P NEW LOW 30 MIN: CPT | Mod: S$GLB,,, | Performed by: ORTHOPAEDIC SURGERY

## 2021-12-07 PROCEDURE — 99999 PR PBB SHADOW E&M-EST. PATIENT-LVL III: ICD-10-PCS | Mod: PBBFAC,,, | Performed by: ORTHOPAEDIC SURGERY

## 2021-12-07 PROCEDURE — 99203 PR OFFICE/OUTPT VISIT, NEW, LEVL III, 30-44 MIN: ICD-10-PCS | Mod: S$GLB,,, | Performed by: ORTHOPAEDIC SURGERY

## 2022-01-03 ENCOUNTER — LAB VISIT (OUTPATIENT)
Dept: FAMILY MEDICINE | Facility: CLINIC | Age: 62
End: 2022-01-03
Payer: COMMERCIAL

## 2022-01-03 ENCOUNTER — PATIENT MESSAGE (OUTPATIENT)
Dept: ADMINISTRATIVE | Facility: OTHER | Age: 62
End: 2022-01-03
Payer: COMMERCIAL

## 2022-01-03 DIAGNOSIS — R05.9 COUGH: ICD-10-CM

## 2022-01-03 PROCEDURE — U0003 INFECTIOUS AGENT DETECTION BY NUCLEIC ACID (DNA OR RNA); SEVERE ACUTE RESPIRATORY SYNDROME CORONAVIRUS 2 (SARS-COV-2) (CORONAVIRUS DISEASE [COVID-19]), AMPLIFIED PROBE TECHNIQUE, MAKING USE OF HIGH THROUGHPUT TECHNOLOGIES AS DESCRIBED BY CMS-2020-01-R: HCPCS | Performed by: FAMILY MEDICINE

## 2022-01-03 NOTE — PROGRESS NOTES
Danya Johnson presented to clinic for COVID-19 swab.   Danya Johnsno verified x2, name and .   Danya Johnson instructed on what will be completed, asked if ever had COVID-19 swab.   Explained procedure to Danya Johnson.   Specimen obtained.   No questions or concerns voiced further at this time.   Danya Johnson left in satisfactory condition.

## 2022-01-06 LAB
SARS-COV-2 RNA RESP QL NAA+PROBE: NOT DETECTED
SARS-COV-2- CYCLE NUMBER: NORMAL

## 2022-04-05 ENCOUNTER — APPOINTMENT (OUTPATIENT)
Dept: LAB | Facility: HOSPITAL | Age: 62
End: 2022-04-05
Attending: OTOLARYNGOLOGY
Payer: COMMERCIAL

## 2022-04-05 DIAGNOSIS — L98.9 LESION OF NECK: Primary | ICD-10-CM

## 2022-04-05 PROCEDURE — 88305 TISSUE EXAM BY PATHOLOGIST: CPT | Mod: 26,,, | Performed by: PATHOLOGY

## 2022-04-05 PROCEDURE — 88305 TISSUE EXAM BY PATHOLOGIST: CPT | Performed by: PATHOLOGY

## 2022-04-05 PROCEDURE — 88305 TISSUE EXAM BY PATHOLOGIST: ICD-10-PCS | Mod: 26,,, | Performed by: PATHOLOGY

## 2022-04-18 LAB
FINAL PATHOLOGIC DIAGNOSIS: NORMAL
GROSS: NORMAL
Lab: NORMAL
MICROSCOPIC EXAM: NORMAL

## 2022-12-15 ENCOUNTER — HOSPITAL ENCOUNTER (OUTPATIENT)
Dept: RADIOLOGY | Facility: HOSPITAL | Age: 62
Discharge: HOME | End: 2022-12-15
Payer: COMMERCIAL

## 2022-12-15 DIAGNOSIS — R05.9 COUGH: ICD-10-CM

## 2022-12-15 DIAGNOSIS — R06.2 WHEEZING: ICD-10-CM

## 2022-12-15 DIAGNOSIS — F17.200 SMOKER: Primary | ICD-10-CM

## 2022-12-15 DIAGNOSIS — F17.200 SMOKER: ICD-10-CM

## 2022-12-15 PROCEDURE — 71046 X-RAY EXAM CHEST 2 VIEWS: CPT | Mod: 26,,, | Performed by: RADIOLOGY

## 2022-12-15 PROCEDURE — 71046 X-RAY EXAM CHEST 2 VIEWS: CPT | Mod: TC

## 2022-12-15 PROCEDURE — 71046 XR CHEST PA AND LATERAL: ICD-10-PCS | Mod: 26,,, | Performed by: RADIOLOGY

## 2023-06-27 ENCOUNTER — HOSPITAL ENCOUNTER (OUTPATIENT)
Dept: RADIOLOGY | Facility: HOSPITAL | Age: 63
Discharge: HOME | End: 2023-06-27
Attending: FAMILY MEDICINE
Payer: COMMERCIAL

## 2023-06-27 DIAGNOSIS — R52 PAIN: ICD-10-CM

## 2023-06-27 DIAGNOSIS — R52 PAIN: Primary | ICD-10-CM

## 2023-06-27 PROCEDURE — 73502 X-RAY EXAM HIP UNI 2-3 VIEWS: CPT | Mod: TC,LT

## 2023-06-27 PROCEDURE — 73502 XR HIP WITH PELVIS WHEN PERFORMED, 2 OR 3 VIEWS LEFT: ICD-10-PCS | Mod: 26,LT,, | Performed by: RADIOLOGY

## 2023-06-27 PROCEDURE — 73502 X-RAY EXAM HIP UNI 2-3 VIEWS: CPT | Mod: 26,LT,, | Performed by: RADIOLOGY

## 2023-06-30 ENCOUNTER — HOSPITAL ENCOUNTER (OUTPATIENT)
Dept: RADIOLOGY | Facility: HOSPITAL | Age: 63
Discharge: HOME | End: 2023-06-30
Attending: FAMILY MEDICINE
Payer: COMMERCIAL

## 2023-06-30 DIAGNOSIS — I71.40 ABDOMINAL AORTIC ANEURYSM WITHOUT RUPTURE: ICD-10-CM

## 2023-07-03 ENCOUNTER — OFFICE VISIT (OUTPATIENT)
Dept: ORTHOPEDICS | Facility: CLINIC | Age: 63
End: 2023-07-03
Payer: COMMERCIAL

## 2023-07-03 VITALS — BODY MASS INDEX: 29.03 KG/M2 | WEIGHT: 184.94 LBS | HEIGHT: 67 IN

## 2023-07-03 DIAGNOSIS — M70.62 TROCHANTERIC BURSITIS OF LEFT HIP: Primary | ICD-10-CM

## 2023-07-03 PROCEDURE — 3008F PR BODY MASS INDEX (BMI) DOCUMENTED: ICD-10-PCS | Mod: S$GLB,,, | Performed by: ORTHOPAEDIC SURGERY

## 2023-07-03 PROCEDURE — 1159F PR MEDICATION LIST DOCUMENTED IN MEDICAL RECORD: ICD-10-PCS | Mod: S$GLB,,, | Performed by: ORTHOPAEDIC SURGERY

## 2023-07-03 PROCEDURE — 99999 PR PBB SHADOW E&M-EST. PATIENT-LVL III: CPT | Mod: PBBFAC,,, | Performed by: ORTHOPAEDIC SURGERY

## 2023-07-03 PROCEDURE — 1160F PR REVIEW ALL MEDS BY PRESCRIBER/CLIN PHARMACIST DOCUMENTED: ICD-10-PCS | Mod: S$GLB,,, | Performed by: ORTHOPAEDIC SURGERY

## 2023-07-03 PROCEDURE — 99204 OFFICE O/P NEW MOD 45 MIN: CPT | Mod: S$GLB,,, | Performed by: ORTHOPAEDIC SURGERY

## 2023-07-03 PROCEDURE — 1160F RVW MEDS BY RX/DR IN RCRD: CPT | Mod: S$GLB,,, | Performed by: ORTHOPAEDIC SURGERY

## 2023-07-03 PROCEDURE — 3008F BODY MASS INDEX DOCD: CPT | Mod: S$GLB,,, | Performed by: ORTHOPAEDIC SURGERY

## 2023-07-03 PROCEDURE — 99999 PR PBB SHADOW E&M-EST. PATIENT-LVL III: ICD-10-PCS | Mod: PBBFAC,,, | Performed by: ORTHOPAEDIC SURGERY

## 2023-07-03 PROCEDURE — 99204 PR OFFICE/OUTPT VISIT, NEW, LEVL IV, 45-59 MIN: ICD-10-PCS | Mod: S$GLB,,, | Performed by: ORTHOPAEDIC SURGERY

## 2023-07-03 PROCEDURE — 1159F MED LIST DOCD IN RCRD: CPT | Mod: S$GLB,,, | Performed by: ORTHOPAEDIC SURGERY

## 2023-07-03 RX ORDER — BUPROPION HYDROCHLORIDE 150 MG/1
TABLET ORAL
COMMUNITY
Start: 2023-04-06

## 2023-07-03 NOTE — PROGRESS NOTES
Subjective:      Patient ID: Danya Johnson is a 63 y.o. female.    Chief Complaint: Pain of the Left Hip    HPI  63-year-old female with a many year history of intermittent pain in her lower back.  Occasionally she has pain when she lays on her left hip.  Denies any trauma.  She takes ibuprofen which does help.  She describing a stabbing burning pain.  Denies any trauma no bowel or bladder symptoms numbness or tingling.  She is otherwise had no recent treatment for her back pain.  ROS      Objective:    Ortho Exam         US Abdomen Complete  Narrative: EXAMINATION:  US ABDOMEN COMPLETE    CLINICAL HISTORY:  Abdominal aortic aneurysm, without rupture, unspecified    TECHNIQUE:  Complete abdominal ultrasound (including pancreas, aorta, liver, gallbladder, common bile duct, IVC, kidneys, and spleen) was performed.    COMPARISON:  None    FINDINGS:  Pancreas: The visualized portions of pancreas appear normal.    Aorta: An infrarenal abdominal aortic aneurysm reaches a maximum diameter of 4 cm.  It is 7 cm in length.  Periaortic hemorrhage or large amounts of mural thrombus are not seen.    Liver: 16.2 cm, normal in size. Homogeneous parenchymal echotexture. No focal lesions.    Gallbladder: No calculi, wall thickening, or pericholecystic fluid.  Negative sonographic Hedrick's sign.    Biliary system: 6 mm common bile duct.  No intrahepatic ductal dilatation.    Inferior vena cava: Normal in appearance.    Right kidney: 9.4 cm. No hydronephrosis.    Left kidney: 10.8 cm. No hydronephrosis.    Spleen: 11.2 cm.  Normal in size with homogeneous echotexture.    Miscellaneous: No ascites.  Impression: 4 cm diameter infrarenal abdominal aortic aneurysm.    Electronically signed by: Jayson Jose MD  Date:    06/30/2023  Time:    14:56       My Radiographs Findings:    Radiographs of for hip show minimal early degenerative change no acute abnormalities noted.  Assessment:       Encounter Diagnosis   Name Primary?     Trochanteric bursitis of left hip Yes         Plan:       I have discussed medical condition and treatment options with her at length I think her pain is multifactorial.  She points to a spot on the left side of her lower back is giving him the most problems.  She probably has some degenerative disc disease she states she has a history of sciatica.  She likely has some bursitis pain as well.  I have offered an injection today that was declined I have offered formal therapy that was declined as well we have offered prescription for NSAIDs declined.  We have provided some rehab exercises for her.  I have suggested that if she has no contraindications and allowed by her PCP that she can take her NSAIDs a bit more regular.  Follow up in 4-6 weeks if symptoms fail to improve sooner if any questions or problems.        Past Medical History:   Diagnosis Date    Arthritis     Hyperlipidemia      Past Surgical History:   Procedure Laterality Date     SECTION  1985    HYSTERECTOMY           Current Outpatient Medications:     diclofenac (VOLTAREN) 75 MG EC tablet, Take 75 mg by mouth 2 (two) times daily., Disp: , Rfl:     rosuvastatin (CRESTOR) 10 MG tablet, Take 10 mg by mouth nightly., Disp: , Rfl:     buPROPion (WELLBUTRIN XL) 150 MG TB24 tablet, , Disp: , Rfl:     ketorolac (TORADOL) 10 mg tablet, Take 1 tablet (10 mg total) by mouth every 6 (six) hours as needed for Pain. (Patient not taking: Reported on 3/8/2021), Disp: 20 tablet, Rfl: 0    Review of patient's allergies indicates:  No Known Allergies    Family History   Problem Relation Age of Onset    Diabetes Mother     Heart disease Mother     Diabetes Father     Heart disease Father      Social History     Occupational History    Not on file   Tobacco Use    Smoking status: Every Day     Years: 30.00     Types: Cigarettes    Smokeless tobacco: Never   Substance and Sexual Activity    Alcohol use: Not Currently    Drug use: Never    Sexual activity: Yes      Partners: Male